# Patient Record
Sex: FEMALE | Race: WHITE | NOT HISPANIC OR LATINO | Employment: UNEMPLOYED | URBAN - NONMETROPOLITAN AREA
[De-identification: names, ages, dates, MRNs, and addresses within clinical notes are randomized per-mention and may not be internally consistent; named-entity substitution may affect disease eponyms.]

---
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
## 2022-01-04 ENCOUNTER — TRANSFERRED RECORDS (OUTPATIENT)
Dept: HEALTH INFORMATION MANAGEMENT | Facility: CLINIC | Age: 22
End: 2022-01-04

## 2022-01-04 ENCOUNTER — MEDICAL CORRESPONDENCE (OUTPATIENT)
Dept: MRI IMAGING | Facility: HOSPITAL | Age: 22
End: 2022-01-04
Payer: COMMERCIAL

## 2022-01-06 ENCOUNTER — HOSPITAL ENCOUNTER (OUTPATIENT)
Dept: MRI IMAGING | Facility: HOSPITAL | Age: 22
Discharge: HOME | End: 2022-01-06
Attending: ORTHOPAEDIC SURGERY | Admitting: ORTHOPAEDIC SURGERY
Payer: COMMERCIAL

## 2022-01-06 DIAGNOSIS — M25.60 DECREASED RANGE OF MOTION: ICD-10-CM

## 2022-01-06 DIAGNOSIS — R52 PAIN: ICD-10-CM

## 2022-01-06 DIAGNOSIS — T14.90XA INJURY: ICD-10-CM

## 2022-01-06 PROCEDURE — 73721 MRI JNT OF LWR EXTRE W/O DYE: CPT | Mod: LT

## 2022-01-21 DIAGNOSIS — F90.2 ATTENTION DEFICIT HYPERACTIVITY DISORDER (ADHD), COMBINED TYPE: ICD-10-CM

## 2022-01-21 RX ORDER — LISDEXAMFETAMINE DIMESYLATE 60 MG/1
60 CAPSULE ORAL DAILY
Qty: 30 CAPSULE | Refills: 0 | Status: SHIPPED | OUTPATIENT
Start: 2022-01-21 | End: 2022-02-21

## 2022-01-21 NOTE — TELEPHONE ENCOUNTER
lisdexamfetamine (VYVANSE) 60 MG capsule        Last Written Prescription Date:  12/23/21  Last Fill Quantity: 30,   # refills: 0  Last Office Visit: 12/23/21  Future Office visit:       Routing refill request to provider for review/approval because:    Drug not on the [facility], [facility] or [facility] refill protocol or controlled substance

## 2022-02-21 ENCOUNTER — VIRTUAL VISIT (OUTPATIENT)
Dept: PEDIATRICS | Facility: OTHER | Age: 22
End: 2022-02-21
Attending: NURSE PRACTITIONER
Payer: COMMERCIAL

## 2022-02-21 DIAGNOSIS — F90.2 ATTENTION DEFICIT HYPERACTIVITY DISORDER (ADHD), COMBINED TYPE: Primary | ICD-10-CM

## 2022-02-21 PROCEDURE — 99213 OFFICE O/P EST LOW 20 MIN: CPT | Mod: 95 | Performed by: NURSE PRACTITIONER

## 2022-02-21 RX ORDER — LISDEXAMFETAMINE DIMESYLATE 60 MG/1
60 CAPSULE ORAL DAILY
Qty: 30 CAPSULE | Refills: 0 | Status: SHIPPED | OUTPATIENT
Start: 2022-02-21 | End: 2022-03-22

## 2022-02-21 ASSESSMENT — PAIN SCALES - GENERAL: PAINLEVEL: NO PAIN (0)

## 2022-02-21 NOTE — PROGRESS NOTES
.Mo is a 21 year old who is being evaluated via a billable telephone visit.      What phone number would you like to be contacted at? [phone]  How would you like to obtain your AVS? Abel    Assessment & Plan     Attention deficit hyperactivity disorder (ADHD), combined type  Will continue Vyvanse 60 mg daily and follow up in about 3 months. Also due for preventative care visit, will complete at the same time.  - lisdexamfetamine (VYVANSE) 60 MG capsule; Take 1 capsule (60 mg) by mouth daily        Return in about 3 months (around 5/21/2022) for Physical Exam, ADHD recheck, sooner with concerns.    Lavonne Wiggins, RODERICK [facility] [place]    Subjective   Mo is a 21 year old who presents for the following health issues     HPI     Medication Followup of Vyvanse     Taking Medication as prescribed: yes    Side Effects:  Loss of appetite but has had this since starting taking. So nothing new     Medication Helping Symptoms:  Yes    Started at [employer] this spring. School is going well. A little more motivation than when in high school.    Not currently working    Tore her ACL at the end of December, will be having surgery at the end of the month         Review of Systems   Constitutional, HEENT, cardiovascular, pulmonary, gi and gu systems are negative, except as otherwise noted.      Objective    Vitals - Patient Reported  Pain Score: No Pain (0)      Vitals:  No vitals were obtained today due to virtual visit.    Physical Exam   healthy, alert and no distress  PSYCH: Alert and oriented times 3; coherent speech, normal   rate and volume, able to articulate logical thoughts, able   to abstract reason, no tangential thoughts, no hallucinations   or delusions  Her affect is normal  RESP: No cough, no audible wheezing, able to talk in full sentences  Remainder of exam unable to be completed due to telephone visits                Phone call duration: 7 minutes

## 2022-02-21 NOTE — NURSING NOTE
"Chief Complaint   Patient presents with     A.D.H.D       Initial There were no vitals taken for this visit. Estimated body mass index is 23.4 kg/m  as calculated from the following:    Height as of 9/13/21: 1.676 m (5' 6\").    Weight as of 12/23/21: 65.8 kg (145 lb).  Medication Reconciliation: complete  Madina Morfin LPN    "

## 2022-02-25 ENCOUNTER — OFFICE VISIT (OUTPATIENT)
Dept: FAMILY MEDICINE | Facility: OTHER | Age: 22
End: 2022-02-25
Attending: NURSE PRACTITIONER
Payer: COMMERCIAL

## 2022-02-25 DIAGNOSIS — Z01.818 PRE-OP EXAM: Primary | ICD-10-CM

## 2022-02-25 PROCEDURE — U0003 INFECTIOUS AGENT DETECTION BY NUCLEIC ACID (DNA OR RNA); SEVERE ACUTE RESPIRATORY SYNDROME CORONAVIRUS 2 (SARS-COV-2) (CORONAVIRUS DISEASE [COVID-19]), AMPLIFIED PROBE TECHNIQUE, MAKING USE OF HIGH THROUGHPUT TECHNOLOGIES AS DESCRIBED BY CMS-2020-01-R: HCPCS

## 2022-02-25 PROCEDURE — U0005 INFEC AGEN DETEC AMPLI PROBE: HCPCS

## 2022-02-26 LAB — SARS-COV-2 RNA RESP QL NAA+PROBE: NEGATIVE

## 2022-03-06 ENCOUNTER — HEALTH MAINTENANCE LETTER (OUTPATIENT)
Age: 22
End: 2022-03-06

## 2022-03-15 ENCOUNTER — TRANSFERRED RECORDS (OUTPATIENT)
Dept: HEALTH INFORMATION MANAGEMENT | Facility: CLINIC | Age: 22
End: 2022-03-15

## 2022-03-22 ENCOUNTER — TELEPHONE (OUTPATIENT)
Dept: PEDIATRICS | Facility: OTHER | Age: 22
End: 2022-03-22
Payer: COMMERCIAL

## 2022-03-22 DIAGNOSIS — F90.2 ATTENTION DEFICIT HYPERACTIVITY DISORDER (ADHD), COMBINED TYPE: ICD-10-CM

## 2022-03-22 RX ORDER — LISDEXAMFETAMINE DIMESYLATE 60 MG/1
60 CAPSULE ORAL DAILY
Qty: 30 CAPSULE | Refills: 0 | Status: SHIPPED | OUTPATIENT
Start: 2022-03-23 | End: 2022-04-21

## 2022-03-22 NOTE — TELEPHONE ENCOUNTER
CALLED AND SPOKE TO PT PARENT AND DUE TO WEATHER PT WILL BE IN TOWN. PT WAS GIVEN THE SCHEDULING NUMBER AND WILL CALL TO CONFIRM APPT

## 2022-03-22 NOTE — PROGRESS NOTES
Assessment & Plan     Attention deficit hyperactivity disorder (ADHD), combined type  Doing well with current 60 mg Vyvanse dosing. Refill was sent in yesterday. Will follow up in about 4-6 months; also due for preventative visit. Can be done this summer or around Mo' birthday. Can have refills for the next 6 months (through September)        22 minutes spent on the date of the encounter doing chart review, history and exam, documentation and further activities per the note           Return in about 4 months (around 7/23/2022) for Physical Exam, ADHD recheck, sooner with concerns.    RODERICK Pollard [facility] [place]    Sonny Hassan is a 21 year old who presents for the following health issues     HPI     ADHD    Onset: since childhood     Description:   Easily distracted: YES  Short attention span: YES  Trouble following directions: YES   Impulsive behavior: YES   Trouble completing tasks: YES    Accompanying Signs & Symptoms:        Change in sleep pattern: recently yes-some nights unable to stay asleep. States normal for her  Irritability at certain times of the day: no  Socially withdrawn: no  Depression symptoms: no  Anxiety symptoms: no    History:  Caffeine intake: Small  Loss of appetite: nothing more than usual   Healthy diet: YES  Did you have problems in school or with previous employment: YES- school a little but not work  Family history of ADHD: no  Have you had an evaluation for ADHD in the past: YES  Do you use alcohol or drugs: no    Therapies tried: Vyvanse with moderate relief    School is going well; not currently working. Recent open ACL repair which is healing well. Pain managed with ibuprofen. PT twice per week at [facility].        Review of Systems   Constitutional, HEENT, cardiovascular, pulmonary, gi and gu systems are negative, except as otherwise noted.      Objective    /70 (BP Location: Right arm, Patient Position: Chair, Cuff  Size: Adult Regular)   Pulse 110   Temp 97.4  F (36.3  C) (Tympanic)   Resp 16   Wt 68 kg (150 lb)   SpO2 100%   BMI 24.21 kg/m    Body mass index is 24.21 kg/m .  Physical Exam   GENERAL: Active, alert, in no acute distress.  HEAD: Normocephalic.  EYES:  No discharge or erythema. Normal pupils and EOM. PERRL.  EARS: Normal canals. Tympanic membranes are normal; gray and translucent.  NOSE: Normal without discharge.  MOUTH/THROAT: Clear. No oral lesions. No oropharyngeal erythema. No tonsillar hypertrophy.  NECK: Supple, no masses.  LYMPH NODES: No adenopathy  LUNGS: Clear. No rales, rhonchi, wheezing or retractions  HEART: Regular rhythm. Normal S1/S2. No murmurs.  NEURO:  No tics or tremor.  Normal tone and strength. Normal gait and balance  MENTAL HEALTH: Mood and affect are appropriate for age.

## 2022-03-22 NOTE — TELEPHONE ENCOUNTER
Pt of [place]    Pt calling and states [facility] is closed in [place] and should have refill there but cannot get.    She picked up her last RX at [facility] in [place].She is leaving [facility] tomorrow and returning Thursday.      Vyvanse   -placed 3.23.2022 for start date  Last Written Prescription Date:2.21.2022    Last Fill Quantity: 30,   # refills: 0  Last Office Visit: 2.21.2022  Future Office visit:    Next 5 appointments (look out 90 days)    Mar 23, 2022  9:45 AM  (Arrive by 9:30 AM)  SHORT with RODERICK Bailon [facility] [place] ([facility] [place] ) [address] [facility] [address]  [phone]           Routing refill request to provider for review/approval because:  Drug not on the [facility], [facility] or  Health refill protocol or controlled substance    Юлия Gonzalez RN

## 2022-03-22 NOTE — TELEPHONE ENCOUNTER
According to the refill request note, Mo is leaving town today and returning Thursday, but I see she has an appointment with me tomorrow (in-person). She is not due to follow up until May, unless she has concerns about her ADHD.    Please call patient to see if she still needs the appointment tomorrow. If not, she can make a preventative visit/ADHD appointment in May or June.

## 2022-03-23 ENCOUNTER — OFFICE VISIT (OUTPATIENT)
Dept: PEDIATRICS | Facility: OTHER | Age: 22
End: 2022-03-23
Attending: NURSE PRACTITIONER
Payer: COMMERCIAL

## 2022-03-23 VITALS
BODY MASS INDEX: 24.21 KG/M2 | HEART RATE: 110 BPM | SYSTOLIC BLOOD PRESSURE: 120 MMHG | WEIGHT: 150 LBS | DIASTOLIC BLOOD PRESSURE: 70 MMHG | TEMPERATURE: 97.4 F | RESPIRATION RATE: 16 BRPM | OXYGEN SATURATION: 100 %

## 2022-03-23 DIAGNOSIS — F90.2 ATTENTION DEFICIT HYPERACTIVITY DISORDER (ADHD), COMBINED TYPE: Primary | ICD-10-CM

## 2022-03-23 PROCEDURE — 99213 OFFICE O/P EST LOW 20 MIN: CPT | Performed by: NURSE PRACTITIONER

## 2022-03-23 RX ORDER — OXYCODONE HYDROCHLORIDE 5 MG/1
TABLET ORAL
COMMUNITY
Start: 2022-03-03 | End: 2022-10-19

## 2022-03-23 ASSESSMENT — PAIN SCALES - GENERAL: PAINLEVEL: NO PAIN (0)

## 2022-03-23 NOTE — NURSING NOTE
"Chief Complaint   Patient presents with     A.D.H.D       Initial /70 (BP Location: Right arm, Patient Position: Chair, Cuff Size: Adult Regular)   Pulse 110   Temp 97.4  F (36.3  C) (Tympanic)   Resp 16   Wt 68 kg (150 lb)   SpO2 100%   BMI 24.21 kg/m   Estimated body mass index is 24.21 kg/m  as calculated from the following:    Height as of 9/13/21: 1.676 m (5' 6\").    Weight as of this encounter: 68 kg (150 lb).  Medication Reconciliation: complete  Madina Morfin LPN    "

## 2022-03-29 ENCOUNTER — TRANSFERRED RECORDS (OUTPATIENT)
Dept: HEALTH INFORMATION MANAGEMENT | Facility: CLINIC | Age: 22
End: 2022-03-29

## 2022-04-21 ENCOUNTER — MYC REFILL (OUTPATIENT)
Dept: PEDIATRICS | Facility: OTHER | Age: 22
End: 2022-04-21
Payer: COMMERCIAL

## 2022-04-21 DIAGNOSIS — F90.2 ATTENTION DEFICIT HYPERACTIVITY DISORDER (ADHD), COMBINED TYPE: ICD-10-CM

## 2022-04-22 RX ORDER — LISDEXAMFETAMINE DIMESYLATE 60 MG/1
60 CAPSULE ORAL DAILY
Qty: 30 CAPSULE | Refills: 0 | Status: SHIPPED | OUTPATIENT
Start: 2022-04-22 | End: 2022-05-20

## 2022-04-22 NOTE — TELEPHONE ENCOUNTER
Lakeshia      Last Written Prescription Date:  3/23/22  Last Fill Quantity: 30,   # refills: 0  Last Office Visit: 3/23/22  Future Office visit:

## 2022-05-03 ENCOUNTER — TRANSFERRED RECORDS (OUTPATIENT)
Dept: HEALTH INFORMATION MANAGEMENT | Facility: CLINIC | Age: 22
End: 2022-05-03

## 2022-05-20 ENCOUNTER — MYC REFILL (OUTPATIENT)
Dept: PEDIATRICS | Facility: OTHER | Age: 22
End: 2022-05-20
Payer: COMMERCIAL

## 2022-05-20 DIAGNOSIS — F90.2 ATTENTION DEFICIT HYPERACTIVITY DISORDER (ADHD), COMBINED TYPE: ICD-10-CM

## 2022-05-23 RX ORDER — LISDEXAMFETAMINE DIMESYLATE 60 MG/1
60 CAPSULE ORAL DAILY
Qty: 30 CAPSULE | Refills: 0 | Status: SHIPPED | OUTPATIENT
Start: 2022-05-23 | End: 2022-06-17

## 2022-05-23 NOTE — TELEPHONE ENCOUNTER
lisdexamfetamine (VYVANSE) 60 MG capsule      Last Written Prescription Date:  04/22/22  Last Fill Quantity: 30,   # refills: 0  Last Office Visit: 03/23/22  Future Office visit:       Routing refill request to provider for review/approval because:  Drug not on the [facility], [facility] or [facility] refill protocol or controlled substance

## 2022-08-16 ENCOUNTER — MYC REFILL (OUTPATIENT)
Dept: PEDIATRICS | Facility: OTHER | Age: 22
End: 2022-08-16

## 2022-08-16 DIAGNOSIS — F90.2 ATTENTION DEFICIT HYPERACTIVITY DISORDER (ADHD), COMBINED TYPE: ICD-10-CM

## 2022-08-18 RX ORDER — LISDEXAMFETAMINE DIMESYLATE 60 MG/1
60 CAPSULE ORAL DAILY
Qty: 30 CAPSULE | Refills: 0 | Status: SHIPPED | OUTPATIENT
Start: 2022-08-18 | End: 2022-09-15

## 2022-08-18 NOTE — TELEPHONE ENCOUNTER
Lakeshia      Last Written Prescription Date:  7/20/22  Last Fill Quantity: 30,   # refills: 0  Last Office Visit: 3/23/22  Future Office visit:

## 2022-09-15 ENCOUNTER — MYC REFILL (OUTPATIENT)
Dept: PEDIATRICS | Facility: OTHER | Age: 22
End: 2022-09-15

## 2022-09-15 DIAGNOSIS — F90.2 ATTENTION DEFICIT HYPERACTIVITY DISORDER (ADHD), COMBINED TYPE: ICD-10-CM

## 2022-09-16 RX ORDER — LISDEXAMFETAMINE DIMESYLATE 60 MG/1
60 CAPSULE ORAL DAILY
Qty: 30 CAPSULE | Refills: 0 | Status: SHIPPED | OUTPATIENT
Start: 2022-09-16 | End: 2022-10-19

## 2022-09-16 NOTE — TELEPHONE ENCOUNTER
vyvance      Last Written Prescription Date:  8/18/22  Last Fill Quantity: 30,   # refills: 0  Last Office Visit: 3/23/22  Future Office visit:

## 2022-10-12 ENCOUNTER — TELEPHONE (OUTPATIENT)
Dept: PEDIATRICS | Facility: OTHER | Age: 22
End: 2022-10-12

## 2022-10-12 NOTE — TELEPHONE ENCOUNTER
4:16 PM    Reason for Call: Phone Call    Description: pt wants to talk to the nurse to see if she is due for another med ck    Was an appointment offered for this call? No  If yes : Appointment type              Date    Preferred method for responding to this message: Telephone Call  What is your phone number ?[phone]    If we cannot reach you directly, may we leave a detailed response at the number you provided? Yes    Can this message wait until your PCP/provider returns, if available today? Elisabeth Michael

## 2022-10-19 ENCOUNTER — VIRTUAL VISIT (OUTPATIENT)
Dept: PEDIATRICS | Facility: OTHER | Age: 22
End: 2022-10-19
Attending: NURSE PRACTITIONER
Payer: COMMERCIAL

## 2022-10-19 DIAGNOSIS — F90.2 ATTENTION DEFICIT HYPERACTIVITY DISORDER (ADHD), COMBINED TYPE: Primary | ICD-10-CM

## 2022-10-19 PROCEDURE — 99213 OFFICE O/P EST LOW 20 MIN: CPT | Mod: 95 | Performed by: NURSE PRACTITIONER

## 2022-10-19 RX ORDER — LISDEXAMFETAMINE DIMESYLATE 60 MG/1
60 CAPSULE ORAL DAILY
Qty: 30 CAPSULE | Refills: 0 | Status: SHIPPED | OUTPATIENT
Start: 2022-10-19 | End: 2022-11-14

## 2022-10-19 RX ORDER — OMEGA-3 FATTY ACIDS/FISH OIL 300-1000MG
400 CAPSULE ORAL EVERY 4 HOURS PRN
COMMUNITY

## 2022-10-19 NOTE — NURSING NOTE
"Chief Complaint   Patient presents with     Recheck Medication       Initial There were no vitals taken for this visit. Estimated body mass index is 24.21 kg/m  as calculated from the following:    Height as of 9/13/21: 1.676 m (5' 6\").    Weight as of 3/23/22: 68 kg (150 lb).  Medication Reconciliation: complete  Felisha Tate LPN    "

## 2022-10-19 NOTE — PROGRESS NOTES
Mo is a 22 year old who is being evaluated via a billable telephone visit.      What phone number would you like to be contacted at? [phone]  How would you like to obtain your AVS? MyChart    Assessment & Plan     Attention deficit hyperactivity disorder (ADHD), combined type  Will continue Vyvanse 60 mg daily. Refill sent.   - lisdexamfetamine (VYVANSE) 60 MG capsule; Take 1 capsule (60 mg) by mouth daily      Return in 6 weeks (on 11/28/2022) for Physical Exam.    RODERICK Pollard [facility] - [place]    Subjective   Mo is a 22 year old, presenting for the following health issues:  Recheck Medication      HPI     Medication Followup of Vyvanse 60 mg    Taking Medication as prescribed: yes    Side Effects:  Lack of appetite, but states she is eating although she has a lack of appetite    Medication Helping Symptoms:  Yes    Is currently in school at [employer] two days per week. 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 Not currently working (had previously been working at the [employer], and they closed for the winter season).     Sleeping as usual. Some days are more difficult to sleep, others are easier.        Review of Systems   Constitutional, HEENT, cardiovascular, pulmonary, gi and gu systems are negative, except as otherwise noted.      Objective    Vitals - Patient Reported  Weight (Patient Reported):  (patient unsure)  Temperature (Patient Reported):  (afebrile)  Pain Score: No Pain (0)        Physical Exam   healthy, alert and no distress  PSYCH: Alert and oriented times 3; coherent speech, normal   rate and volume, able to articulate logical thoughts, able   to abstract reason, no tangential thoughts, no hallucinations   or delusions  Her affect is normal  RESP: No cough, no audible wheezing, able to talk in full sentences  Remainder of exam unable to be completed due to telephone visits                Phone call duration: 6 minutes

## 2022-11-14 ENCOUNTER — MYC REFILL (OUTPATIENT)
Dept: PEDIATRICS | Facility: OTHER | Age: 22
End: 2022-11-14

## 2022-11-14 DIAGNOSIS — F90.2 ATTENTION DEFICIT HYPERACTIVITY DISORDER (ADHD), COMBINED TYPE: ICD-10-CM

## 2022-11-14 RX ORDER — LISDEXAMFETAMINE DIMESYLATE 60 MG/1
60 CAPSULE ORAL DAILY
Qty: 30 CAPSULE | Refills: 0 | Status: SHIPPED | OUTPATIENT
Start: 2022-11-14 | End: 2022-12-15

## 2022-11-14 NOTE — TELEPHONE ENCOUNTER
loki      Last Written Prescription Date:  10/19/2022  Last Fill Quantity: 30,   # refills: 0  Last Office Visit: 10/19/2022  Future Office visit:    Next 5 appointments (look out 90 days)    Nov 28, 2022  9:45 AM  (Arrive by 9:30 AM)  PHYSICAL with RODERICK Bailon CNP  [facility] [place] ([facility] [place] ) [address]  [place] [address]  [phone]           Routing refill request to provider for review/approval because:

## 2022-11-21 ENCOUNTER — HEALTH MAINTENANCE LETTER (OUTPATIENT)
Age: 22
End: 2022-11-21

## 2022-12-15 ENCOUNTER — MYC REFILL (OUTPATIENT)
Dept: PEDIATRICS | Facility: OTHER | Age: 22
End: 2022-12-15

## 2022-12-15 DIAGNOSIS — F90.2 ATTENTION DEFICIT HYPERACTIVITY DISORDER (ADHD), COMBINED TYPE: ICD-10-CM

## 2022-12-15 RX ORDER — LISDEXAMFETAMINE DIMESYLATE 60 MG/1
60 CAPSULE ORAL DAILY
Qty: 30 CAPSULE | Refills: 0 | Status: SHIPPED | OUTPATIENT
Start: 2022-12-15 | End: 2023-01-14

## 2023-01-14 ENCOUNTER — MYC REFILL (OUTPATIENT)
Dept: PEDIATRICS | Facility: OTHER | Age: 23
End: 2023-01-14

## 2023-01-14 DIAGNOSIS — F90.2 ATTENTION DEFICIT HYPERACTIVITY DISORDER (ADHD), COMBINED TYPE: ICD-10-CM

## 2023-01-16 RX ORDER — LISDEXAMFETAMINE DIMESYLATE 60 MG/1
60 CAPSULE ORAL DAILY
Qty: 30 CAPSULE | Refills: 0 | Status: SHIPPED | OUTPATIENT
Start: 2023-01-16 | End: 2023-02-10

## 2023-02-10 ENCOUNTER — MYC REFILL (OUTPATIENT)
Dept: PEDIATRICS | Facility: OTHER | Age: 23
End: 2023-02-10

## 2023-02-10 DIAGNOSIS — F90.2 ATTENTION DEFICIT HYPERACTIVITY DISORDER (ADHD), COMBINED TYPE: ICD-10-CM

## 2023-02-13 RX ORDER — LISDEXAMFETAMINE DIMESYLATE 60 MG/1
60 CAPSULE ORAL DAILY
Qty: 30 CAPSULE | Refills: 0 | Status: SHIPPED | OUTPATIENT
Start: 2023-02-13 | End: 2023-03-14

## 2023-02-13 NOTE — TELEPHONE ENCOUNTER
lisdexamfetamine 60mg      Last Written Prescription Date:  1/16/23  Last Fill Quantity: 30,   # refills: 0  Last Office Visit: 11/28/22  Future Office visit:       Routing refill request to provider for review/approval because:

## 2023-03-14 ENCOUNTER — MYC REFILL (OUTPATIENT)
Dept: PEDIATRICS | Facility: OTHER | Age: 23
End: 2023-03-14

## 2023-03-14 DIAGNOSIS — F90.2 ATTENTION DEFICIT HYPERACTIVITY DISORDER (ADHD), COMBINED TYPE: ICD-10-CM

## 2023-03-16 RX ORDER — LISDEXAMFETAMINE DIMESYLATE 60 MG/1
60 CAPSULE ORAL DAILY
Qty: 30 CAPSULE | Refills: 0 | Status: SHIPPED | OUTPATIENT
Start: 2023-03-16 | End: 2023-04-14

## 2023-03-16 NOTE — TELEPHONE ENCOUNTER
Mo is overdue to be seen in clinic for a preventative visit and ADHD follow up. Please call patient to schedule.

## 2023-03-16 NOTE — TELEPHONE ENCOUNTER
Attempt # 1  Outcome: Talked with Patient    Comment: spoke to pt and advised due for physical/adhd follow up; pt states will call back to schedule

## 2023-03-16 NOTE — TELEPHONE ENCOUNTER
Lisdexamfetamine (Vyvanse) 60 MG capsule    Last Written Prescription Date:  02/13/2023  Last Fill Quantity: 30,   # refills: 0  Last Office Visit: 03/23/2022

## 2023-03-28 RX ORDER — TRETINOIN 0.25 MG/G
CREAM TOPICAL
COMMUNITY
Start: 2022-11-16 | End: 2023-10-02

## 2023-03-28 RX ORDER — AZELAIC ACID 0.15 G/G
GEL TOPICAL
COMMUNITY
Start: 2022-11-16 | End: 2023-10-02

## 2023-03-29 ENCOUNTER — OFFICE VISIT (OUTPATIENT)
Dept: PEDIATRICS | Facility: OTHER | Age: 23
End: 2023-03-29
Attending: NURSE PRACTITIONER
Payer: COMMERCIAL

## 2023-03-29 ENCOUNTER — TELEPHONE (OUTPATIENT)
Dept: PEDIATRICS | Facility: OTHER | Age: 23
End: 2023-03-29

## 2023-03-29 VITALS
TEMPERATURE: 97.4 F | DIASTOLIC BLOOD PRESSURE: 66 MMHG | WEIGHT: 142 LBS | BODY MASS INDEX: 22.82 KG/M2 | HEART RATE: 104 BPM | OXYGEN SATURATION: 99 % | SYSTOLIC BLOOD PRESSURE: 112 MMHG | HEIGHT: 66 IN

## 2023-03-29 DIAGNOSIS — F90.2 ATTENTION DEFICIT HYPERACTIVITY DISORDER (ADHD), COMBINED TYPE: Primary | ICD-10-CM

## 2023-03-29 DIAGNOSIS — Z02.5 SPORTS PHYSICAL: ICD-10-CM

## 2023-03-29 PROCEDURE — 99213 OFFICE O/P EST LOW 20 MIN: CPT | Performed by: NURSE PRACTITIONER

## 2023-03-29 ASSESSMENT — PAIN SCALES - GENERAL: PAINLEVEL: NO PAIN (0)

## 2023-03-29 NOTE — PROGRESS NOTES
Assessment & Plan   1. Attention deficit hyperactivity disorder (ADHD), combined type  Stable. Will continue on Vyvanse 60 mg daily.    2. Sports physical  Cleared for softball, form completed and left at  for Mo to .         Return in about 6 months (around 9/29/2023) for Physical Exam, Medication follow up.  Marta Penny, Nurse Practitioner Student    I have seen this patient with the student. The student documented the visit and I have edited and verified the history, physical examination, assessment and plan. The examination and medical decision making was performed by me.    RODERICK Pollard [facility] [place]    Subjective   Mo is a 22 year old, presenting for the following health issues:  A.D.H.D  No flowsheet data found.  HPI     ADHD    Onset: since childhood     Description:   Easily distracted: YES  Short attention span: YES  Trouble following directions: YES   Impulsive behavior: YES   Trouble completing tasks: YES    Accompanying Signs & Symptoms:        Change in sleep pattern: none  Irritability at certain times of the day: YES  Socially withdrawn: No  Depression symptoms: No  Anxiety symptoms: YES    History:  Caffeine intake: Small  Loss of appetite: YES  Healthy diet: No  Did you have problems in school or with previous employment: YES  Family history of ADHD: No  Have you had an evaluation for ADHD in the past: YES  Do you use alcohol or drugs: No    Therapies tried: vyvanse with intermittent relief    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  Unchanged decreased appetite, but has been eating small amounts several times a day rather than full meals x3 per day.  Endorses getting enough fluids. Denies N/V.   Sleep is good-approximately 7-8 hour per day. Chronic waking x1 per night, is not concerned about this, she gets up to have a snack or go to the  "bathroom.    Would like sports physical done today, too. Plans to play softball at [employer]. Has form in her car and will bring in today.    Review of Systems   Constitutional, HEENT, cardiovascular, pulmonary, gi and gu systems are negative, except as otherwise noted.      Objective    /66 (BP Location: Right arm, Patient Position: Sitting, Cuff Size: Adult Regular)   Pulse 104   Temp 97.4  F (36.3  C) (Tympanic)   Ht 1.676 m (5' 6\")   Wt 64.4 kg (142 lb)   LMP 03/22/2023 (Approximate)   SpO2 99%   BMI 22.92 kg/m    Body mass index is 22.92 kg/m .  Physical Exam   GENERAL: healthy, alert and no distress  EYES: Eyes grossly normal to inspection, PERRL and conjunctivae and sclerae normal  HENT: ear canals and TM's normal, nose and mouth without ulcers or lesions  NECK: no adenopathy, no asymmetry, masses, or scars and thyroid normal to palpation  RESP: lungs clear to auscultation - no rales, rhonchi or wheezes  BREAST: normal without masses, tenderness or nipple discharge and no palpable axillary masses or adenopathy  CV: regular rate and rhythm, normal S1 S2, no S3 or S4, no murmur, click or rub, no peripheral edema and peripheral pulses strong  ABDOMEN: soft, nontender, no hepatosplenomegaly, no masses and bowel sounds normal  MS: no gross musculoskeletal defects noted, no edema  SKIN: no suspicious lesions or rashes  NEURO: Normal strength and tone, mentation intact and speech normal  PSYCH: mentation appears normal, affect normal/bright  SPORTS EXAM:    No Marfan stigmata: kyphoscoliosis, high-arched palate, pectus excavatuM, arachnodactyly, arm span > height, hyperlaxity, myopia, MVP, aortic insufficieny)  Eyes: normal fundoscopic and pupils  Cardiovascular: normal PMI, simultaneous femoral/radial pulses, no murmurs (standing, supine, Valsalva)  Skin: no HSV, MRSA, tinea corporis  Musculoskeletal    Neck: normal    Back: normal    Shoulder/arm: normal    Elbow/forearm: normal    Wrist/hand/fingers: " normal    Hip/thigh: normal    Knee: normal    Leg/ankle: normal    Foot/toes: normal    Functional (Single Leg Hop or Squat): normal      Diagnostics: None today

## 2023-04-14 ENCOUNTER — MYC REFILL (OUTPATIENT)
Dept: PEDIATRICS | Facility: OTHER | Age: 23
End: 2023-04-14

## 2023-04-14 DIAGNOSIS — F90.2 ATTENTION DEFICIT HYPERACTIVITY DISORDER (ADHD), COMBINED TYPE: ICD-10-CM

## 2023-04-16 ENCOUNTER — HEALTH MAINTENANCE LETTER (OUTPATIENT)
Age: 23
End: 2023-04-16

## 2023-04-17 RX ORDER — LISDEXAMFETAMINE DIMESYLATE 60 MG/1
60 CAPSULE ORAL DAILY
Qty: 30 CAPSULE | Refills: 0 | Status: SHIPPED | OUTPATIENT
Start: 2023-04-17 | End: 2023-05-11

## 2023-04-17 NOTE — TELEPHONE ENCOUNTER
vyvance      Last Written Prescription Date:  3/16/23  Last Fill Quantity: 30,   # refills: 0  Last Office Visit: 3/29/23  Future Office visit:

## 2023-05-11 ENCOUNTER — MYC REFILL (OUTPATIENT)
Dept: PEDIATRICS | Facility: OTHER | Age: 23
End: 2023-05-11

## 2023-05-11 DIAGNOSIS — F90.2 ATTENTION DEFICIT HYPERACTIVITY DISORDER (ADHD), COMBINED TYPE: ICD-10-CM

## 2023-05-12 RX ORDER — LISDEXAMFETAMINE DIMESYLATE 60 MG/1
60 CAPSULE ORAL DAILY
Qty: 30 CAPSULE | Refills: 0 | Status: SHIPPED | OUTPATIENT
Start: 2023-05-12 | End: 2023-06-10

## 2023-05-12 NOTE — TELEPHONE ENCOUNTER
Lisdexamfetamine (Vyvanse) 60 MG capsule    Last Written Prescription Date:  04/17/2023  Last Fill Quantity: 30,   # refills: 0  Last Office Visit: 03/29/2023  Future Office visit:       Routing refill request to provider for review/approval because:  Drug not on the [facility], P or [facility] refill protocol or controlled substance

## 2023-06-10 ENCOUNTER — MYC REFILL (OUTPATIENT)
Dept: PEDIATRICS | Facility: OTHER | Age: 23
End: 2023-06-10

## 2023-06-10 DIAGNOSIS — F90.2 ATTENTION DEFICIT HYPERACTIVITY DISORDER (ADHD), COMBINED TYPE: ICD-10-CM

## 2023-06-12 RX ORDER — LISDEXAMFETAMINE DIMESYLATE 60 MG/1
60 CAPSULE ORAL DAILY
Qty: 30 CAPSULE | Refills: 0 | Status: SHIPPED | OUTPATIENT
Start: 2023-06-12 | End: 2023-07-10

## 2023-06-12 NOTE — TELEPHONE ENCOUNTER
loki       Last Written Prescription Date:  5-12-23  Last Fill Quantity: 30,   # refills: 0  Last Office Visit: 3-29-23  Future Office visit:       Routing refill request to provider for review/approval because:

## 2023-07-10 ENCOUNTER — MYC REFILL (OUTPATIENT)
Dept: PEDIATRICS | Facility: OTHER | Age: 23
End: 2023-07-10

## 2023-07-10 DIAGNOSIS — F90.2 ATTENTION DEFICIT HYPERACTIVITY DISORDER (ADHD), COMBINED TYPE: ICD-10-CM

## 2023-07-10 RX ORDER — LISDEXAMFETAMINE DIMESYLATE 60 MG/1
60 CAPSULE ORAL DAILY
Qty: 30 CAPSULE | Refills: 0 | Status: SHIPPED | OUTPATIENT
Start: 2023-07-10 | End: 2023-08-09

## 2023-07-10 NOTE — TELEPHONE ENCOUNTER
Lisdexamfetamine (Vyvanse) 60 MG capsule    Last Written Prescription Date:  06/12/2023  Last Fill Quantity: 30,   # refills: 0  Last Office Visit: 03/29/2023  Future Office visit:       Routing refill request to provider for review/approval because:  Drug not on the [facility], P or [facility] refill protocol or controlled substance

## 2023-07-20 NOTE — TELEPHONE ENCOUNTER
Mom called patient has appointment on 9/27 and only has 4 pills left.  Please advise.    lisdexamfetamine (VYVANSE) 60 MG capsule       Last Written Prescription Date:  8/27/20  Last Fill Quantity: 15,   # refills: 0  Last Office Visit: 12/6/19  Future Office visit:    Next 5 appointments (look out 90 days)    Sep 17, 2020  1:30 PM CDT  (Arrive by 1:15 PM)  SHORT with Dell Erickson DO  [facility] ([facility] [place] ) [address]  [place] [address]  [phone]           Routing refill request to provider for review/approval because:  Drug not on the [facility], P or [facility] refill protocol or controlled substance    
Show Aperture Variable?: Yes
Duration Of Freeze Thaw-Cycle (Seconds): 10
Detail Level: Simple
Post-Care Instructions: I reviewed with the patient in detail post-care instructions. Patient is to wear sunprotection, and avoid picking at any of the treated lesions. Pt may apply Vaseline to crusted or scabbing areas.
Render Post-Care Instructions In Note?: no
Number Of Freeze-Thaw Cycles: 3 freeze-thaw cycles
Consent: The patient's consent was obtained including but not limited to risks of crusting, scabbing, blistering, scarring, darker or lighter pigmentary change, recurrence, incomplete removal and infection.

## 2023-07-27 ENCOUNTER — APPOINTMENT (OUTPATIENT)
Dept: GENERAL RADIOLOGY | Facility: HOSPITAL | Age: 23
End: 2023-07-27
Attending: NURSE PRACTITIONER
Payer: COMMERCIAL

## 2023-07-27 ENCOUNTER — HOSPITAL ENCOUNTER (EMERGENCY)
Facility: HOSPITAL | Age: 23
Discharge: HOME | End: 2023-07-27
Attending: NURSE PRACTITIONER | Admitting: NURSE PRACTITIONER
Payer: COMMERCIAL

## 2023-07-27 VITALS
BODY MASS INDEX: 23.27 KG/M2 | WEIGHT: 144.18 LBS | TEMPERATURE: 97.7 F | DIASTOLIC BLOOD PRESSURE: 78 MMHG | OXYGEN SATURATION: 100 % | SYSTOLIC BLOOD PRESSURE: 108 MMHG | HEART RATE: 71 BPM | RESPIRATION RATE: 16 BRPM

## 2023-07-27 DIAGNOSIS — S61.319A LACERATION OF FINGERNAIL, INITIAL ENCOUNTER: ICD-10-CM

## 2023-07-27 PROCEDURE — 73140 X-RAY EXAM OF FINGER(S): CPT | Mod: LT

## 2023-07-27 PROCEDURE — 99213 OFFICE O/P EST LOW 20 MIN: CPT | Performed by: NURSE PRACTITIONER

## 2023-07-27 PROCEDURE — G0463 HOSPITAL OUTPT CLINIC VISIT: HCPCS

## 2023-07-27 ASSESSMENT — ENCOUNTER SYMPTOMS
VOMITING: 0
NAUSEA: 0
WOUND: 1
LIGHT-HEADEDNESS: 1
FEVER: 0
DIAPHORESIS: 1
NUMBNESS: 0
CHILLS: 0
ACTIVITY CHANGE: 1

## 2023-07-27 ASSESSMENT — ACTIVITIES OF DAILY LIVING (ADL): ADLS_ACUITY_SCORE: 35

## 2023-07-27 NOTE — ED PROVIDER NOTES
History     Chief Complaint   Patient presents with    Hand Injury     HPI  Mo Tellez is a 22 year old female who is accompanied per mom.  She presents with a left middle finger injury after slamming her finger in a car door.  Fingernail is split and finger is tingling.  Had diaphoresis and lightheadedness after injury occurred; has resolved.  Took ibuprofen after incident occurred and has wrapped her finger with dry dressing.  Denies previous injuries.  Last Tdap 2014.  Denies fevers, chills, nausea, and vomiting.    Musculoskeletal problem/pain    Duration:  today  Description  Location: left middle finger. Right hand dominant  Intensity:  2/10. throbbing  Accompanying signs and symptoms: tingling  History  Previous similar problem: no   Previous evaluation:  none  Precipitating or alleviating factors:  Trauma or overuse: YES- slammed in car door  Aggravating factors include: moving  Therapies tried and outcome: support wrap and Ibuprofen       Allergies:  No Known Allergies    Problem List:    Patient Active Problem List    Diagnosis Date Noted    Anxiety disorder, unspecified 11/29/2019     Priority: Medium    High ankle sprain 10/04/2017     Priority: Medium    Attention deficit hyperactivity disorder (ADHD), combined type 09/28/2016     Priority: Medium    Common wart 09/19/2014     Priority: Medium    Plantar warts 09/19/2014     Priority: Medium        Past Medical History:    Past Medical History:   Diagnosis Date    Anisocoria 07/20/2018    Attention deficit disorder of childhood with hyper 03/08/2013       Past Surgical History:    Past Surgical History:   Procedure Laterality Date    KNEE SURGERY Left 03/02/2022    Open ACL repair    PE TUBES      TUBES         Family History:    Family History   Problem Relation Age of Onset    Arthritis Mother     Depression Mother     Diabetes Paternal Grandmother     Cancer Maternal Grandmother         lymphoma    Depression Maternal Grandmother     Cancer  Paternal Grandfather         lymphoma    Asthma No family hx of        Social History:  Marital Status:  Single [1]  Social History     Tobacco Use    Smoking status: Never    Smokeless tobacco: Never    Tobacco comments:     no passive exposure   Vaping Use    Vaping Use: Never used   Substance Use Topics    Alcohol use: No     Alcohol/week: 0.0 standard drinks of alcohol    Drug use: No        Medications:    azelaic acid (FINACIA) 15 % external gel  lisdexamfetamine (VYVANSE) 60 MG capsule  tretinoin (RETIN-A) 0.025 % external cream  tretinoin (RETIN-A) 0.05 % external gel  albuterol (PROAIR HFA/PROVENTIL HFA/VENTOLIN HFA) 108 (90 Base) MCG/ACT inhaler  clindamycin (CLEOCIN T) 1 % external solution  ibuprofen (ADVIL/MOTRIN) 200 MG capsule          Review of Systems   Constitutional:  Positive for activity change and diaphoresis. Negative for chills and fever.   Gastrointestinal:  Negative for nausea and vomiting.   Musculoskeletal:         Left middle finger injury   Skin:  Positive for wound (left middle finger).   Neurological:  Positive for light-headedness. Negative for numbness (tingling left middle finger).       Physical Exam   BP: 108/78  Pulse: 71  Temp: 97.7  F (36.5  C)  Resp: 16  Weight: 65.4 kg (144 lb 2.9 oz)  SpO2: 100 %      Physical Exam  Vitals and nursing note reviewed.   Constitutional:       General: She is in acute distress (Mild).      Appearance: She is normal weight.   Cardiovascular:      Rate and Rhythm: Normal rate.   Pulmonary:      Effort: Pulmonary effort is normal.   Musculoskeletal:         General: Tenderness and signs of injury present. No swelling.      Left hand: Laceration (Fingernail of middle finger) and tenderness present. Decreased range of motion. Normal pulse.        Hands:    Skin:     Findings: No bruising or erythema.   Neurological:      Mental Status: She is alert and oriented to person, place, and time.   Psychiatric:         Behavior: Behavior normal.         ED  Course                 Procedures           Results for orders placed or performed during the hospital encounter of 07/27/23 (from the past 24 hour(s))   Fingers XR, 2-3 views, left    Narrative    PROCEDURE:  XR FINGER LEFT G/E 2 VIEWS    HISTORY: slammed in car door. finger tip injury    COMPARISON:  None.    TECHNIQUE:  XR FINGER LEFT 3 VIEWS    FINDINGS:   No fracture or dislocation is identified. The joint spaces are  preserved.     No foreign body is seen.     Soft tissues are within normal limits.        Impression    IMPRESSION:   No acute osseous abnormality.    ELDER ARSHAD MD         SYSTEM ID:  [id number]       Medications - No data to display    Assessments & Plan (with Medical Decision Making)     I have reviewed the nursing notes.    I have reviewed the findings, diagnosis, plan and need for follow up with the patient.  (C11.899P) Laceration of fingernail, initial encounter  Comment:22 year old female who is accompanied per mom.  She presents with a left middle finger injury after slamming her finger in a car door.  Fingernail is split and finger is tingling.  Had diaphoresis and lightheadedness after injury occurred; has resolved.  Took ibuprofen after incident occurred and has wrapped her finger with dry dressing.  Denies previous injuries.  Last Tdap 2014.  Denies fevers, chills, nausea, and vomiting.    MDM: Laceration of distal, lateral, half of  left middle finger fingernail.  No swelling, ecchymosis, or erythema. Slightly decreased flexion and extension related to pain.  CMS intact    Left middle finger x-ray reviewed and per radiology:No acute osseous abnormality.     Secure seal applied to left middle fingernail.   Dressing applied to finger per LPN.    Plan:Do once or  twice daily dressing changes for the next 48 to 72 hours. You may shower . If you have increased pain, redness at wound site, fevers, or abnormal drainage (purulent/pus) you need to see your primary care provider or return to  Urgent Care/ER immediately. Acetaminophen/tylenol  or ibuprofen for pain.  Verbally instructed: Glue will come off by itself. Try no to pick at it. Keep fingernail trimmed  These discharge instructions and medications were reviewed with her and mom and understanding verbalized.    This document was prepared using a combination of typing and voice generated software.  While every attempt was made for accuracy, spelling and grammatical errors may exist.  Discharge Medication List as of 7/27/2023  5:07 PM          Final diagnoses:   Laceration of fingernail, initial encounter       7/27/2023   [facility]         Rachna Pugh, CNP  07/27/23 [id number]

## 2023-07-27 NOTE — DISCHARGE INSTRUCTIONS
Do once or  twice daily dressing changes for the next 48 to 72 hours. You may shower . If you have increased pain, redness at wound site, fevers, or abnormal drainage (purulent/pus) you need to see your primary care provider or return to Urgent Care/ER immediately. Acetaminophen/tylenol  or ibuprofen for pain. Glue will come off by itself. Try no to pick at it. Keep fingernail trimmed

## 2023-07-27 NOTE — ED TRIAGE NOTES
Pt presents with c/o slamming middle finger in a car door.   Pt does have a cut down the nail and is bleeding currently   No bruising or swelling noted   Pt has full ROM with throbbing pain   Denies any previous hx of fracture or injury to that finger   Denies being on blood thinners   Happened today   Last had ibu at 1530

## 2023-08-09 ENCOUNTER — MYC REFILL (OUTPATIENT)
Dept: PEDIATRICS | Facility: OTHER | Age: 23
End: 2023-08-09

## 2023-08-09 DIAGNOSIS — F90.2 ATTENTION DEFICIT HYPERACTIVITY DISORDER (ADHD), COMBINED TYPE: ICD-10-CM

## 2023-08-09 RX ORDER — LISDEXAMFETAMINE DIMESYLATE 60 MG/1
60 CAPSULE ORAL DAILY
Qty: 30 CAPSULE | Refills: 0 | Status: SHIPPED | OUTPATIENT
Start: 2023-08-09 | End: 2023-08-14

## 2023-08-09 NOTE — TELEPHONE ENCOUNTER
Lakeshia       Last Written Prescription Date:  7-10-23  Last Fill Quantity: 30,   # refills: 0  Last Office Visit: 3-29-23  Future Office visit:       Routing refill request to provider for review/approval because:

## 2023-09-07 ENCOUNTER — MYC MEDICAL ADVICE (OUTPATIENT)
Dept: PEDIATRICS | Facility: OTHER | Age: 23
End: 2023-09-07

## 2023-09-11 ENCOUNTER — MYC MEDICAL ADVICE (OUTPATIENT)
Dept: PEDIATRICS | Facility: OTHER | Age: 23
End: 2023-09-11

## 2023-09-11 DIAGNOSIS — F90.2 ATTENTION DEFICIT HYPERACTIVITY DISORDER (ADHD), COMBINED TYPE: ICD-10-CM

## 2023-09-11 RX ORDER — LISDEXAMFETAMINE DIMESYLATE 60 MG/1
60 CAPSULE ORAL DAILY
Qty: 30 CAPSULE | Refills: 0 | Status: SHIPPED | OUTPATIENT
Start: 2023-09-11 | End: 2023-09-12

## 2023-09-11 NOTE — TELEPHONE ENCOUNTER
Lakeshia       Last Written Prescription Date:  8/14/23  Last Fill Quantity: 30,   # refills: 0  Last Office Visit: 3-29-23  Future Office visit:    Next 5 appointments (look out 90 days)      Sep 25, 2023  2:00 PM  (Arrive by 1:45 PM)  PHYSICAL with RODERICK Bailon CNP  [facility] [place] ([facility] [place] ) [address]  [place] [address]  [phone]             Routing refill request to provider for review/approval because:  Drug not on the [facility], [facility] or [facility] refill protocol or controlled substance

## 2023-09-12 ENCOUNTER — MYC MEDICAL ADVICE (OUTPATIENT)
Dept: PEDIATRICS | Facility: OTHER | Age: 23
End: 2023-09-12

## 2023-09-12 DIAGNOSIS — F90.2 ATTENTION DEFICIT HYPERACTIVITY DISORDER (ADHD), COMBINED TYPE: ICD-10-CM

## 2023-09-12 NOTE — TELEPHONE ENCOUNTER
Marcos Alfaro  1954  [id number]    Situation:  Verbal report received from: Jami Bolivar  Procedure: Procedure(s):  COLONOSCOPY  ENDOSCOPIC POLYPECTOMY    Background:    Preoperative diagnosis: HEMOCULT BLOOD IN STOOL  Postoperative diagnosis: colon polyps, hemorrhoids    :  Dr. Ang Malone  Assistant(s): Endoscopy RN-1: Adela Urbano RN; Kristi Pruitt RN    Specimens:   ID Type Source Tests Collected by Time Destination   1 : Colon, Transverse polyp  Preservative Colon, Transverse  Dax Coyne MD 12/1/2021 1304 Pathology   2 : Colon, Ascending polyp  Preservative Colon, Ascending  Dax Coyne MD 12/1/2021 1307 Pathology   3 : Cecum BX Preservative Cecum  Dax Coyne MD 12/1/2021 1310 Pathology   4 : Colon, Ascending polyp BX Preservative Colon, Ascending  Dax Coyne MD 12/1/2021 1314 Pathology   5 : Left colon polyp BX Preservative   Dax Coyne MD 12/1/2021 1317 Pathology     H. Pylori  no    Assessment:  Intra-procedure medications     Anesthesia gave intra-procedure sedation and medications, see anesthesia flow sheet yes    Intravenous fluids: NS@ KVO     Vital signs stable     Abdominal assessment: round and soft     Recommendation:  Discharge patient per MD order.   Return to floor  Family or Friend   Permission to share finding with family or friend yes Needs sent to new pharmacy.

## 2023-09-13 RX ORDER — LISDEXAMFETAMINE DIMESYLATE 60 MG/1
60 CAPSULE ORAL DAILY
Qty: 30 CAPSULE | Refills: 0 | Status: SHIPPED | OUTPATIENT
Start: 2023-09-13 | End: 2023-09-13

## 2023-09-13 RX ORDER — LISDEXAMFETAMINE DIMESYLATE 60 MG/1
60 CAPSULE ORAL DAILY
Qty: 30 CAPSULE | Refills: 0 | Status: SHIPPED | OUTPATIENT
Start: 2023-09-13 | End: 2023-10-08

## 2023-09-27 NOTE — PATIENT INSTRUCTIONS
Due for pap smear. Please schedule with gynecology or a family practice provider.      Psychologists/ Counselors                        Jas Euceda          ...            ..[phone]  [facility]                    ..  [phone]  [facility]                 [phone]  [facility] (kids)       .         [phone]  [facility](teens)                ..[phone]  [facility]                    [phone]  [facility]                   [phone]  [facility]           ...      .. [phone]  [facility]                ...[phone]  [facility]     ...          ...[phone]  [facility]               [phone]   [facility]..            [phone]  [facility]                       [phone]  [facility]               .    [phone]  [facility]                ..[phone]  [facility]     .      . ..[phone]     [facility]              .   [phone]  [facility]                   [phone]              [facility]              .   [phone]  [facility]              .  ...[phone]  [facility]              .   ..[phone]  [facility]           ...      .. [phone]  [facility]              . ..[phone]  [facility]              .    ..[phone]  [facility]              .     .. [phone]  [facility]                 ... [phone]  [facility]                         .[phone]  [facility]     .      . ..[phone]  [facility]...............................................................[phone]            [facility]              ..  [phone]                   [facility]             . ... ..[phone]  [facility]              .      ..[phone]  [facility]              .     ..[phone]  [facility]        .      . [phone]  [facility]           .[phone]  [facility]         .    ..[phone]  [employer]             . ..  ..  [phone]  [facility]            [phone]  [facility]              ..[phone]  [facility]                     [phone]    [facility]           ..     .[phone]     [facility]    ...     ...[phone]     [facility]            [phone]                                                  [place]  Bakari Shearer                ..  .  [phone]  [facility]         .     .  [phone]  [facility] MARLIN Prieto              . [phone]  [facility]  ..        [phone]  [facility]              .    [phone]    [facility]               ...  .  [phone]  [facility]             . [phone]  [facility]            [phone]  [facility]     .      . ..[phone]               [facility]....................................................[phone]          *Facilities in bold italics indicate medication management  Services are offered.     Crisis support    If you or someone you know is struggling or in crisis, help is available. Call or text 009 or chat [employer]    The volunteer Crisis Counselor will help you move from a 'hot moment to a cool moment'      Preventive Health Recommendations  Female Ages 21 to 25     Yearly exam:   See your health care provider every year in order to  Review health changes.   Discuss preventive care.    Review your medicines if your doctor has prescribed any.    You should be tested each year for STDs  (sexually transmitted diseases).     Talk to your provider about how often you should have cholesterol testing.    Get a Pap test every three years. If you have an abnormal result, your doctor may have you test more often.    If you are at risk for diabetes, you should have a diabetes test (fasting glucose).     Shots:   Get a flu shot each year.   Get a tetanus shot every 10 years.   Consider getting the shot (vaccine) that prevents cervical cancer (Gardasil).    Nutrition:   Eat at least 5 servings of fruits and vegetables each day.  Eat whole-grain bread, whole-wheat pasta and brown rice instead of white grains and rice.  Get adequate Calcium and Vitamin D.     Lifestyle  Exercise at least 150 minutes a week each week (30 minutes a day, 5 days a week). This will help you control your weight and prevent disease.  Limit alcohol to one drink per day.  No smoking.   Wear sunscreen to prevent skin cancer.  See your dentist every six months for an exam and cleaning.

## 2023-10-02 ENCOUNTER — OFFICE VISIT (OUTPATIENT)
Dept: PEDIATRICS | Facility: OTHER | Age: 23
End: 2023-10-02
Attending: NURSE PRACTITIONER
Payer: COMMERCIAL

## 2023-10-02 VITALS
TEMPERATURE: 97.3 F | DIASTOLIC BLOOD PRESSURE: 60 MMHG | BODY MASS INDEX: 22.49 KG/M2 | OXYGEN SATURATION: 100 % | RESPIRATION RATE: 16 BRPM | HEART RATE: 108 BPM | SYSTOLIC BLOOD PRESSURE: 124 MMHG | WEIGHT: 143.3 LBS | HEIGHT: 67 IN

## 2023-10-02 DIAGNOSIS — Z00.00 ROUTINE GENERAL MEDICAL EXAMINATION AT A HEALTH CARE FACILITY: Primary | ICD-10-CM

## 2023-10-02 DIAGNOSIS — F90.2 ATTENTION DEFICIT HYPERACTIVITY DISORDER (ADHD), COMBINED TYPE: ICD-10-CM

## 2023-10-02 PROCEDURE — 99395 PREV VISIT EST AGE 18-39: CPT | Performed by: NURSE PRACTITIONER

## 2023-10-02 ASSESSMENT — PAIN SCALES - GENERAL: PAINLEVEL: NO PAIN (0)

## 2023-10-02 ASSESSMENT — ENCOUNTER SYMPTOMS
COUGH: 0
CONSTIPATION: 0
DIZZINESS: 0
CHILLS: 0
FEVER: 0
ABDOMINAL PAIN: 0
HEADACHES: 0
DIARRHEA: 0
MYALGIAS: 0
DYSURIA: 0
HEMATOCHEZIA: 0
FREQUENCY: 0
JOINT SWELLING: 0
HEMATURIA: 0
WEAKNESS: 0
ARTHRALGIAS: 0
NAUSEA: 0
NERVOUS/ANXIOUS: 1
HEARTBURN: 0
PALPITATIONS: 0
PARESTHESIAS: 0
SORE THROAT: 0
SHORTNESS OF BREATH: 0
EYE PAIN: 0
BREAST MASS: 0

## 2023-10-02 ASSESSMENT — ANXIETY QUESTIONNAIRES
1. FEELING NERVOUS, ANXIOUS, OR ON EDGE: SEVERAL DAYS
GAD7 TOTAL SCORE: 1
5. BEING SO RESTLESS THAT IT IS HARD TO SIT STILL: NOT AT ALL
4. TROUBLE RELAXING: NOT AT ALL
2. NOT BEING ABLE TO STOP OR CONTROL WORRYING: NOT AT ALL
6. BECOMING EASILY ANNOYED OR IRRITABLE: NOT AT ALL
GAD7 TOTAL SCORE: 1
3. WORRYING TOO MUCH ABOUT DIFFERENT THINGS: NOT AT ALL
7. FEELING AFRAID AS IF SOMETHING AWFUL MIGHT HAPPEN: NOT AT ALL

## 2023-10-02 ASSESSMENT — PATIENT HEALTH QUESTIONNAIRE - PHQ9
10. IF YOU CHECKED OFF ANY PROBLEMS, HOW DIFFICULT HAVE THESE PROBLEMS MADE IT FOR YOU TO DO YOUR WORK, TAKE CARE OF THINGS AT HOME, OR GET ALONG WITH OTHER PEOPLE: SOMEWHAT DIFFICULT
SUM OF ALL RESPONSES TO PHQ QUESTIONS 1-9: 3
SUM OF ALL RESPONSES TO PHQ QUESTIONS 1-9: 3

## 2023-10-02 NOTE — PROGRESS NOTES
SUBJECTIVE:   CC: Mo is an 23 year old who presents for preventive health visit.       10/2/2023    10:17 AM   Additional Questions   Roomed by Madina Morfin   Accompanied by self       Healthy Habits:     Getting at least 3 servings of Calcium per day:  NO    Bi-annual eye exam:  NO    Dental care twice a year:  NO    Sleep apnea or symptoms of sleep apnea:  None    Diet:  Regular (no restrictions)    Frequency of exercise:  1 day/week    Duration of exercise:  45-60 minutes    Taking medications regularly:  Yes    Medication side effects:  Other    Additional concerns today:  No    ADHD: Feels her current medication (Vyvanse 60 mg) is working well.    Endorses intermittent anxiety, which tends to last a day or so and then improve. Would like to talk about a possible referral for therapy at her next visit.    Currently living with her parents. Her partner lives in [place]. Mo is not currently working or in school, but is actively looking for a job. She is thinking of applying at [employer] in [place] and either living with her partner or staying with her parents in [place] and commuting. She feels that her anxiety is better when she is busy, so believes it will get better once she is working and in more of a routine.    Today's PHQ-9 Score:       10/2/2023    10:11 AM   PHQ-9 SCORE   PHQ-9 Total Score MyChart 3 (Minimal depression)   PHQ-9 Total Score 3           Social History     Tobacco Use    Smoking status: Never    Smokeless tobacco: Never    Tobacco comments:     no passive exposure   Substance Use Topics    Alcohol use: No     Alcohol/week: 0.0 standard drinks of alcohol             10/2/2023    10:21 AM   Alcohol Use   Prescreen: >3 drinks/day or >7 drinks/week? Not Applicable          No data to display              Reviewed orders with patient.  Reviewed health maintenance and updated orders accordingly - Yes  Labs ordered, will have drawn when Mo returns for pap smear.  BP Readings from  Last 3 Encounters:   10/02/23 124/60   07/27/23 108/78   03/29/23 112/66    Wt Readings from Last 3 Encounters:   10/02/23 65 kg (143 lb 4.8 oz)   07/27/23 65.4 kg (144 lb 2.9 oz)   03/29/23 64.4 kg (142 lb)                  Patient Active Problem List   Diagnosis    Common wart    Plantar warts    Attention deficit hyperactivity disorder (ADHD), combined type    High ankle sprain    Anxiety disorder, unspecified     Past Surgical History:   Procedure Laterality Date    KNEE SURGERY Left 03/02/2022    Open ACL repair    PE TUBES      TUBES         Social History     Tobacco Use    Smoking status: Never    Smokeless tobacco: Never    Tobacco comments:     no passive exposure   Substance Use Topics    Alcohol use: No     Alcohol/week: 0.0 standard drinks of alcohol     Family History   Problem Relation Age of Onset    Arthritis Mother     Depression Mother     Diabetes Paternal Grandmother     Cancer Maternal Grandmother         lymphoma    Depression Maternal Grandmother     Cancer Paternal Grandfather         lymphoma    Asthma No family hx of          Current Outpatient Medications   Medication Sig Dispense Refill    lisdexamfetamine (VYVANSE) 60 MG capsule Take 1 capsule (60 mg) by mouth daily 30 capsule 0    ibuprofen (ADVIL/MOTRIN) 200 MG capsule Take 400 mg by mouth every 4 hours as needed for fever (Patient not taking: Reported on 3/29/2023)       No Known Allergies    Breast Cancer Screening:        History of abnormal Pap smear: NO - age 21-29 PAP every 3 years recommended     Reviewed and updated as needed this visit by clinical staff   Tobacco  Allergies  Meds  Problems  Med Hx  Surg Hx  Fam Hx          Reviewed and updated as needed this visit by Provider   Tobacco  Allergies  Meds  Problems  Med Hx  Surg Hx  Fam Hx             Review of Systems   Constitutional:  Negative for chills and fever.   HENT:  Negative for congestion, ear pain, hearing loss and sore throat.    Eyes:  Negative for  "pain and visual disturbance.   Respiratory:  Negative for cough and shortness of breath.    Cardiovascular:  Negative for chest pain, palpitations and peripheral edema.   Gastrointestinal:  Negative for abdominal pain, constipation, diarrhea, heartburn, hematochezia and nausea.   Breasts:  Negative for tenderness, breast mass and discharge.   Genitourinary:  Positive for vaginal discharge. Negative for dysuria, frequency, genital sores, hematuria, pelvic pain, urgency and vaginal bleeding.   Musculoskeletal:  Negative for arthralgias, joint swelling and myalgias.   Skin:  Negative for rash.   Neurological:  Negative for dizziness, weakness, headaches and paresthesias.   Psychiatric/Behavioral:  Negative for mood changes. The patient is nervous/anxious.           OBJECTIVE:   /60 (BP Location: Right arm, Patient Position: Chair, Cuff Size: Adult Regular)   Pulse 108   Temp 97.3  F (36.3  C) (Tympanic)   Resp 16   Ht 1.689 m (5' 6.5\")   Wt 65 kg (143 lb 4.8 oz)   LMP 09/15/2023   SpO2 100%   BMI 22.78 kg/m    Physical Exam  GENERAL: healthy, alert and no distress  EYES: Eyes grossly normal to inspection, PERRL and conjunctivae and sclerae normal  HENT: ear canals and TM's normal, nose and mouth without ulcers or lesions  NECK: no adenopathy, no asymmetry, masses, or scars and thyroid normal to palpation  RESP: lungs clear to auscultation - no rales, rhonchi or wheezes  CV: regular rate and rhythm, normal S1 S2, no S3 or S4, no murmur, click or rub, no peripheral edema and peripheral pulses strong  ABDOMEN: soft, nontender, no hepatosplenomegaly, no masses and bowel sounds normal   (female): deferred  MS: no gross musculoskeletal defects noted, no edema  SKIN: Acne to face, no other suspicious lesions or rashes  NEURO: Normal strength and tone, mentation intact and speech normal  PSYCH: mentation appears normal, affect normal/bright    Diagnostic Test Results:  pending    ASSESSMENT/PLAN:   1. Routine " general medical examination at a health care facility  List of local therapists given in AVS; will further discuss anxiety at next appointment or sooner as needed. Pap smear deferred, will make appointment with gynecology or family practice  - HIV Antigen Antibody Combo; Future  - Hepatitis C Screen Reflex to HCV RNA Quant and Genotype; Future  - CBC with platelets; Future  - Ferritin; Future  - TSH with free T4 reflex; Future  - Vitamin D Deficiency; Future  - Lipid Profile (Chol, Trig, HDL, LDL calc); Future    2. Attention deficit hyperactivity disorder (ADHD), combined type  Will continue Vyvanse 60 mg daily and follow up in about 3-4 months            COUNSELING:  Reviewed preventive health counseling, as reflected in patient instructions       Regular exercise       Healthy diet/nutrition       Consider Hep C screening for all patients one time for ages 18-79 years       HIV screeninx in teen years, 1x in adult years, and at intervals if high risk        She reports that she has never smoked. She has never used smokeless tobacco.          RODERICK Pollard CNP  [facility]

## 2023-10-08 ENCOUNTER — MYC REFILL (OUTPATIENT)
Dept: PEDIATRICS | Facility: OTHER | Age: 23
End: 2023-10-08

## 2023-10-08 DIAGNOSIS — F90.2 ATTENTION DEFICIT HYPERACTIVITY DISORDER (ADHD), COMBINED TYPE: ICD-10-CM

## 2023-10-09 RX ORDER — LISDEXAMFETAMINE DIMESYLATE 60 MG/1
60 CAPSULE ORAL DAILY
Qty: 30 CAPSULE | Refills: 0 | Status: SHIPPED | OUTPATIENT
Start: 2023-10-09 | End: 2023-11-07

## 2023-10-09 NOTE — TELEPHONE ENCOUNTER
Lakeshia      Last Written Prescription Date:  9/13/23  Last Fill Quantity: 30,   # refills: 0  Last Office Visit: 10/2/23  Future Office visit:       Routing refill request to provider for review/approval because:

## 2023-11-07 ENCOUNTER — MYC REFILL (OUTPATIENT)
Dept: PEDIATRICS | Facility: OTHER | Age: 23
End: 2023-11-07

## 2023-11-07 DIAGNOSIS — F90.2 ATTENTION DEFICIT HYPERACTIVITY DISORDER (ADHD), COMBINED TYPE: ICD-10-CM

## 2023-11-08 RX ORDER — LISDEXAMFETAMINE DIMESYLATE 60 MG/1
60 CAPSULE ORAL DAILY
Qty: 30 CAPSULE | Refills: 0 | Status: SHIPPED | OUTPATIENT
Start: 2023-11-08 | End: 2023-12-07

## 2023-11-08 NOTE — TELEPHONE ENCOUNTER
Lakeshia      Last Written Prescription Date:  10.9.23  Last Fill Quantity: #30,   # refills: 0  Last Office Visit: 10.2.23  Future Office visit:       Routing refill request to provider for review/approval because:  Drug not on the [facility], P or [facility] refill protocol or controlled substance

## 2023-12-07 ENCOUNTER — MYC REFILL (OUTPATIENT)
Dept: PEDIATRICS | Facility: OTHER | Age: 23
End: 2023-12-07

## 2023-12-07 DIAGNOSIS — F90.2 ATTENTION DEFICIT HYPERACTIVITY DISORDER (ADHD), COMBINED TYPE: ICD-10-CM

## 2023-12-08 RX ORDER — LISDEXAMFETAMINE DIMESYLATE 60 MG/1
60 CAPSULE ORAL DAILY
Qty: 30 CAPSULE | Refills: 0 | Status: SHIPPED | OUTPATIENT
Start: 2023-12-08 | End: 2024-01-06

## 2023-12-08 NOTE — TELEPHONE ENCOUNTER
lisdexamfetamine (VYVANSE) 60 MG capsule 30 capsule 0 11/8/2023   Last Office Visit: 10.2.23   Future Office visit:       Routing refill request to provider for review/approval because:

## 2024-01-06 ENCOUNTER — MYC REFILL (OUTPATIENT)
Dept: PEDIATRICS | Facility: OTHER | Age: 24
End: 2024-01-06

## 2024-01-06 DIAGNOSIS — F90.2 ATTENTION DEFICIT HYPERACTIVITY DISORDER (ADHD), COMBINED TYPE: ICD-10-CM

## 2024-01-08 RX ORDER — LISDEXAMFETAMINE DIMESYLATE 60 MG/1
60 CAPSULE ORAL DAILY
Qty: 30 CAPSULE | Refills: 0 | Status: SHIPPED | OUTPATIENT
Start: 2024-01-08 | End: 2024-02-08

## 2024-01-08 NOTE — TELEPHONE ENCOUNTER
Lakeshia      Last Written Prescription Date:  12/8/23  Last Fill Quantity: 30,   # refills: 0  Last Office Visit: 10/2/23  Future Office visit:

## 2024-02-08 ENCOUNTER — MYC REFILL (OUTPATIENT)
Dept: PEDIATRICS | Facility: OTHER | Age: 24
End: 2024-02-08

## 2024-02-08 DIAGNOSIS — F90.2 ATTENTION DEFICIT HYPERACTIVITY DISORDER (ADHD), COMBINED TYPE: ICD-10-CM

## 2024-02-08 RX ORDER — LISDEXAMFETAMINE DIMESYLATE 60 MG/1
60 CAPSULE ORAL DAILY
Qty: 30 CAPSULE | Refills: 0 | Status: SHIPPED | OUTPATIENT
Start: 2024-02-08 | End: 2024-03-11

## 2024-02-08 NOTE — TELEPHONE ENCOUNTER
Lakeshia      Last Written Prescription Date:  1/8/2024  Last Fill Quantity: 30,   # refills: 0  Last Office Visit: 10/2/2023  Future Office visit:       Routing refill request to provider for review/approval because:  Drug not on the [facility], P or [facility] refill protocol or controlled substance

## 2024-03-08 ENCOUNTER — MYC REFILL (OUTPATIENT)
Dept: PEDIATRICS | Facility: OTHER | Age: 24
End: 2024-03-08

## 2024-03-08 DIAGNOSIS — F90.2 ATTENTION DEFICIT HYPERACTIVITY DISORDER (ADHD), COMBINED TYPE: ICD-10-CM

## 2024-03-08 RX ORDER — LISDEXAMFETAMINE DIMESYLATE 60 MG/1
60 CAPSULE ORAL DAILY
Qty: 30 CAPSULE | Refills: 0 | Status: CANCELLED | OUTPATIENT
Start: 2024-03-08

## 2024-03-11 ENCOUNTER — VIRTUAL VISIT (OUTPATIENT)
Dept: PEDIATRICS | Facility: OTHER | Age: 24
End: 2024-03-11
Attending: NURSE PRACTITIONER
Payer: COMMERCIAL

## 2024-03-11 DIAGNOSIS — F41.8 OTHER SPECIFIED ANXIETY DISORDERS: ICD-10-CM

## 2024-03-11 DIAGNOSIS — F90.2 ATTENTION DEFICIT HYPERACTIVITY DISORDER (ADHD), COMBINED TYPE: Primary | ICD-10-CM

## 2024-03-11 PROCEDURE — 99213 OFFICE O/P EST LOW 20 MIN: CPT | Mod: 93 | Performed by: NURSE PRACTITIONER

## 2024-03-11 RX ORDER — LISDEXAMFETAMINE DIMESYLATE 60 MG/1
60 CAPSULE ORAL DAILY
Qty: 30 CAPSULE | Refills: 0 | Status: SHIPPED | OUTPATIENT
Start: 2024-03-11 | End: 2024-04-09

## 2024-03-11 NOTE — TELEPHONE ENCOUNTER
Lakeshia      Last Written Prescription Date:  2.8.24  Last Fill Quantity: #30,   # refills: 0  Last Office Visit: 10.2.23  Future Office visit:       Routing refill request to provider for review/approval because:  Drug not on the [facility], P or [facility] refill protocol or controlled substance

## 2024-03-11 NOTE — PROGRESS NOTES
"Mo is a 23 year old who is being evaluated via a billable telephone visit.      What phone number would you like to be contacted at? [phone]  How would you like to obtain your AVS? Abel  Originating Location (pt. Location): Other Work    Distant Location (provider location):  On-site    Assessment & Plan     Attention deficit hyperactivity disorder (ADHD), combined type  Will continue Vyvanse 60 mg and follow up in about 6 months; sooner with concerns  - lisdexamfetamine (VYVANSE) 60 MG capsule; Take 1 capsule (60 mg) by mouth daily  - Adult Mental Health  Referral; Future    Other specified anxiety disorders  Referral made for therapy  - Adult Mental Health  Referral; Future        Return in about 6 months (around 9/11/2024) for Medication follow up, sooner with concerns.    Subjective   Mo is a 23 year old, presenting for the following health issues:  Recheck Medication        3/11/2024    12:29 PM   Additional Questions   Roomed by Jamaal De Oliveira   Accompanied by None         3/11/2024    12:29 PM   Patient Reported Additional Medications   Patient reports taking the following new medications None     HPI     ADHD  Onset: since childhood   Description:   Easily distracted: YES  Short attention span: YES  Trouble following directions: No   Impulsive behavior: YES- Improved    Trouble completing tasks: YES  Accompanying Signs & Symptoms:        Change in sleep pattern: Falling asleep in the couch, sleep schedule is \"messed up.\"  Irritability at certain times of the day: YES- Certain days are worse than others   Socially withdrawn: YES  Depression symptoms: No  Anxiety symptoms: YES - intermittent bouts of anxiety. More difficulty coping some days when very anxious, but other days better. Would like to look into therapy with someone in [place].   History:  Caffeine intake: None  Loss of appetite: YES  Healthy diet: No  Did you have problems in school or with previous employment: " No  Family history of ADHD: No  Have you had an evaluation for ADHD in the past: YES  Do you use alcohol or drugs: No  Therapies tried: Vyvanse with intermittent relief   Currently living with parents, will be moving in with her girlfriend in mid-April or May. Moving to [place]. 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  Additional stress - grandfather had a stroke last week.        Review of Systems  Constitutional, HEENT, cardiovascular, pulmonary, gi and gu systems are negative, except as otherwise noted.      Objective    Vitals - Patient Reported  Pain Score: No Pain (0)      Vitals:  No vitals were obtained today due to virtual visit.    Physical Exam   General: Alert and no distress //Respiratory: No audible wheeze, cough, or shortness of breath // Psychiatric:  Appropriate affect, tone, and pace of words            Phone call duration: 12 minutes  Signed Electronically by: RODERICK Pollard CNP

## 2024-04-09 ENCOUNTER — MYC REFILL (OUTPATIENT)
Dept: PEDIATRICS | Facility: OTHER | Age: 24
End: 2024-04-09

## 2024-04-09 DIAGNOSIS — F90.2 ATTENTION DEFICIT HYPERACTIVITY DISORDER (ADHD), COMBINED TYPE: ICD-10-CM

## 2024-04-09 RX ORDER — LISDEXAMFETAMINE DIMESYLATE 60 MG/1
60 CAPSULE ORAL DAILY
Qty: 30 CAPSULE | Refills: 0 | Status: SHIPPED | OUTPATIENT
Start: 2024-04-09 | End: 2024-05-08

## 2024-04-09 NOTE — TELEPHONE ENCOUNTER
Lakeshia      Last Written Prescription Date:  3/11/2024  Last Fill Quantity: 30,   # refills: 0  Last Office Visit: 3/11/2024  Future Office visit:       Routing refill request to provider for review/approval because:  Drug not on the [facility], P or [facility] refill protocol or controlled substance

## 2024-05-08 ENCOUNTER — MYC REFILL (OUTPATIENT)
Dept: PEDIATRICS | Facility: OTHER | Age: 24
End: 2024-05-08

## 2024-05-08 DIAGNOSIS — F90.2 ATTENTION DEFICIT HYPERACTIVITY DISORDER (ADHD), COMBINED TYPE: ICD-10-CM

## 2024-05-08 RX ORDER — LISDEXAMFETAMINE DIMESYLATE 60 MG/1
60 CAPSULE ORAL DAILY
Qty: 30 CAPSULE | Refills: 0 | Status: SHIPPED | OUTPATIENT
Start: 2024-05-08 | End: 2024-06-05

## 2024-05-08 NOTE — TELEPHONE ENCOUNTER
Lakeshia      Last Written Prescription Date:  4/9/24  Last Fill Quantity: 30,   # refills: 0  Last Office Visit: 3/11/24  Future Office visit:       Routing refill request to provider for review/approval because:

## 2024-06-05 ENCOUNTER — MYC REFILL (OUTPATIENT)
Dept: PEDIATRICS | Facility: OTHER | Age: 24
End: 2024-06-05

## 2024-06-05 DIAGNOSIS — F90.2 ATTENTION DEFICIT HYPERACTIVITY DISORDER (ADHD), COMBINED TYPE: ICD-10-CM

## 2024-06-05 RX ORDER — LISDEXAMFETAMINE DIMESYLATE 60 MG/1
60 CAPSULE ORAL DAILY
Qty: 30 CAPSULE | Refills: 0 | Status: SHIPPED | OUTPATIENT
Start: 2024-06-05 | End: 2024-07-08

## 2024-07-08 ENCOUNTER — MYC REFILL (OUTPATIENT)
Dept: PEDIATRICS | Facility: OTHER | Age: 24
End: 2024-07-08

## 2024-07-08 DIAGNOSIS — F90.2 ATTENTION DEFICIT HYPERACTIVITY DISORDER (ADHD), COMBINED TYPE: ICD-10-CM

## 2024-07-08 RX ORDER — LISDEXAMFETAMINE DIMESYLATE 60 MG/1
60 CAPSULE ORAL DAILY
Qty: 30 CAPSULE | Refills: 0 | Status: SHIPPED | OUTPATIENT
Start: 2024-07-08 | End: 2024-08-07

## 2024-07-08 NOTE — TELEPHONE ENCOUNTER
Lakeshia      Last Written Prescription Date:  6/5/2024  Last Fill Quantity: 30,   # refills: 0  Last Office Visit: 3/11/2024  Future Office visit:       Routing refill request to provider for review/approval because:  Drug not on the [facility], P or [facility] refill protocol or controlled substance

## 2024-08-07 ENCOUNTER — MYC REFILL (OUTPATIENT)
Dept: PEDIATRICS | Facility: OTHER | Age: 24
End: 2024-08-07

## 2024-08-07 DIAGNOSIS — F90.2 ATTENTION DEFICIT HYPERACTIVITY DISORDER (ADHD), COMBINED TYPE: ICD-10-CM

## 2024-08-07 RX ORDER — LISDEXAMFETAMINE DIMESYLATE 60 MG/1
60 CAPSULE ORAL DAILY
Qty: 30 CAPSULE | Refills: 0 | Status: SHIPPED | OUTPATIENT
Start: 2024-08-07 | End: 2024-09-07

## 2024-08-07 NOTE — TELEPHONE ENCOUNTER
Lakeshia       Last Written Prescription Date:  7/8/2024  Last Fill Quantity: 30,   # refills: 0  Last Office Visit: 3/11/2024

## 2024-09-07 ENCOUNTER — MYC REFILL (OUTPATIENT)
Dept: PEDIATRICS | Facility: OTHER | Age: 24
End: 2024-09-07

## 2024-09-07 DIAGNOSIS — F90.2 ATTENTION DEFICIT HYPERACTIVITY DISORDER (ADHD), COMBINED TYPE: ICD-10-CM

## 2024-09-09 RX ORDER — LISDEXAMFETAMINE DIMESYLATE 60 MG/1
60 CAPSULE ORAL DAILY
Qty: 30 CAPSULE | Refills: 0 | Status: SHIPPED | OUTPATIENT
Start: 2024-09-09

## 2024-09-09 NOTE — TELEPHONE ENCOUNTER
Lakeshia      Last Written Prescription Date:  8/7/2024  Last Fill Quantity: 30,   # refills: 0  Last Office Visit: 3/11/2024  Future Office visit:       Routing refill request to provider for review/approval because:  Drug not on the [facility], P or [facility] refill protocol or controlled substance

## 2024-10-04 NOTE — PROGRESS NOTES
Assessment & Plan     Attention deficit hyperactivity disorder (ADHD), combined type  Doing well with Vyvanse 60 mg daily. Will continue and follow up in about 4 months. May have refills through February 2025.    Phone number for Mehul Siegel printed in [facility] (where therapy referral was sent). Will contact me if needing a new referral, or if desiring it to be sent elsewhere.    Viral URI with cough  Negative for Covid-19, influenza, RSV. Symptoms are mild at present. Continue symptomatic treatment: encourage fluid intake, humidification. Nasal saline, may try  honey for cough. Symptoms should improve after 7-10 days of illness, although cough may persist longer. Cough should improve over time.     - Symptomatic Influenza A/B, RSV, & SARS-CoV2 PCR (COVID-19) Nose    Acne vulgaris  Mo will contact me via [employer] regarding the supplement she would like to ask about. Continue cleansing and moisturizing twice daily. Recommend trying Differin OTC at night; she had previously used tretinoin, but skin was sensitive.    34 minutes spent on the date of the encounter doing chart review, history and exam, documentation and further activities per the note      Return in about 4 months (around 2/9/2025) for Medication follow up, due for preventative visit, due for PAP testing.  May contact [facility] in [place] for PAP testing, or may schedule here with gynecology.    Sonny Hassan is a 24 year old, presenting for the following health issues:  A.D.H.D        10/9/2024     1:55 PM   Additional Questions   Roomed by Mo Oreilly   Accompanied by Self         10/9/2024     1:55 PM   Patient Reported Additional Medications   Patient reports taking the following new medications none     History of Present Illness       Reason for visit:  Med check   She is taking medications regularly.         Medication Followup - Vyvanse  Taking Medication as prescribed: yes  Side Effects:  Lack of appetite, nausea, and will feel  off balance. States she is used to the side effects.   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  Grandfather passed away in April, which has been hard.  Did not hear about therapy referral that was placed last spring    Acute Illness  Acute illness concerns: Allergy symptoms vs illness   Onset/Duration: 10/8/2024  Symptoms:  Fever: No  Chills/Sweats: YES - chills  Headache (location?): No  Sinus Pressure: YES  Conjunctivitis:  No  Ear Pain: YES: right  Rhinorrhea: No  Congestion: YES  Sore Throat: YES  Cough: YES-productive of clear sputum, productive of green sputum  Wheeze: No  Decreased Appetite: YES  Nausea: No  Vomiting: No  Diarrhea: No  Dysuria/Freq.: No  Dysuria or Hematuria: No  Fatigue/Achiness: YES  Sick/Strep Exposure: No  Therapies tried and outcome: None    Symptoms started suddenly yesterday. Normal energy level, sleeping well. Interested in Covid-19 testing today.    Wondering about supplements that may help with acne. She has been considering some supplements that are supposed to reduce inflammation. Wants to know if they will be safe to take with her ADHD medication. She does not remember the name right now, but states she has taken in the past when she was taking doxycyline for her acne prescribed by dermatology. She will send a [employer] message with the name of the supplement after this visit. Currently using cleanser, moisturizer daily.       Review of Systems  Constitutional, HEENT, cardiovascular, pulmonary, gi and gu systems are negative, except as otherwise noted.      Objective    /70 (BP Location: Left arm, Patient Position: Sitting, Cuff Size: Adult Regular)   Pulse 94   Temp 98.8  F (37.1  C) (Tympanic)   Wt 71.4 kg (157 lb 4.8 oz)   LMP 09/12/2024 (Approximate)   SpO2 99%   BMI 25.01 kg/m    Body mass index is 25.01 kg/m .  Physical Exam   GENERAL: alert and no distress  EYES:  Eyes grossly normal to inspection, PERRL and conjunctivae and sclerae normal  HENT: normal cephalic/atraumatic, ear canals and TM's normal, nose and mouth without ulcers or lesions, oropharynx clear, oral mucous membranes moist, tonsillar erythema, and no tonsillar exudate  NECK: no adenopathy, no asymmetry, masses, or scars  RESP: lungs clear to auscultation - no rales, rhonchi or wheezes  CV: regular rate and rhythm, normal S1 S2, no S3 or S4, no murmur, click or rub, no peripheral edema  ABDOMEN: soft, nontender, no hepatosplenomegaly, no masses and bowel sounds normal  MS: no gross musculoskeletal defects noted, no edema  SKIN: Mild acne comedones to face  PSYCH: mentation appears normal, affect normal/bright    Results for orders placed or performed in visit on 10/09/24   Symptomatic Influenza A/B, RSV, & SARS-CoV2 PCR (COVID-19) Nose     Status: Normal    Specimen: Nose; Swab   Result Value Ref Range    Influenza A PCR Negative Negative    Influenza B PCR Negative Negative    RSV PCR Negative Negative    SARS CoV2 PCR Negative Negative    Narrative    Testing was performed using the Xpert Xpress CoV2/Flu/RSV Assay on the [employer] GeneXpert Instrument. This test should be ordered for the detection of SARS-CoV2, influenza, and RSV viruses in individuals with signs and symptoms of respiratory tract infection. This test is for in vitro diagnostic use under the [place] FDA for laboratories certified under CLIA to perform high or moderate complexity testing. This test has been [place] FDA cleared. A negative result does not rule out the presence of PCR inhibitors in the specimen or target RNA in concentration below the limit of detection for the assay. If only one viral target is positive but coinfection with multiple targets is suspected, the sample should be re-tested with another FDA cleared, approved, or authorized test, if coninfection would change clinical management. This test was validated by the [facility]. These laboratories are certified under the Clinical Laboratory Improvement Amendments of 1988 (CLIA-88) as qualified to perfom high complexity laboratory testing.             Signed Electronically by: RODERICK Pollard CNP

## 2024-10-09 ENCOUNTER — MYC REFILL (OUTPATIENT)
Dept: PEDIATRICS | Facility: OTHER | Age: 24
End: 2024-10-09

## 2024-10-09 ENCOUNTER — OFFICE VISIT (OUTPATIENT)
Dept: PEDIATRICS | Facility: OTHER | Age: 24
End: 2024-10-09
Attending: NURSE PRACTITIONER
Payer: COMMERCIAL

## 2024-10-09 VITALS
TEMPERATURE: 98.8 F | OXYGEN SATURATION: 99 % | HEART RATE: 94 BPM | SYSTOLIC BLOOD PRESSURE: 114 MMHG | WEIGHT: 157.3 LBS | DIASTOLIC BLOOD PRESSURE: 70 MMHG | BODY MASS INDEX: 25.01 KG/M2

## 2024-10-09 DIAGNOSIS — F90.2 ATTENTION DEFICIT HYPERACTIVITY DISORDER (ADHD), COMBINED TYPE: Primary | ICD-10-CM

## 2024-10-09 DIAGNOSIS — F90.2 ATTENTION DEFICIT HYPERACTIVITY DISORDER (ADHD), COMBINED TYPE: ICD-10-CM

## 2024-10-09 DIAGNOSIS — L70.0 ACNE VULGARIS: ICD-10-CM

## 2024-10-09 DIAGNOSIS — J06.9 VIRAL URI WITH COUGH: ICD-10-CM

## 2024-10-09 LAB
FLUAV RNA SPEC QL NAA+PROBE: NEGATIVE
FLUBV RNA RESP QL NAA+PROBE: NEGATIVE
RSV RNA SPEC NAA+PROBE: NEGATIVE
SARS-COV-2 RNA RESP QL NAA+PROBE: NEGATIVE

## 2024-10-09 PROCEDURE — 99214 OFFICE O/P EST MOD 30 MIN: CPT | Performed by: NURSE PRACTITIONER

## 2024-10-09 PROCEDURE — 87637 SARSCOV2&INF A&B&RSV AMP PRB: CPT | Performed by: NURSE PRACTITIONER

## 2024-10-09 ASSESSMENT — PAIN SCALES - GENERAL: PAINLEVEL: MILD PAIN (2)

## 2024-10-10 RX ORDER — LISDEXAMFETAMINE DIMESYLATE 60 MG/1
60 CAPSULE ORAL DAILY
Qty: 30 CAPSULE | Refills: 0 | Status: SHIPPED | OUTPATIENT
Start: 2024-10-10 | End: 2024-11-05

## 2024-11-05 ENCOUNTER — MYC REFILL (OUTPATIENT)
Dept: PEDIATRICS | Facility: OTHER | Age: 24
End: 2024-11-05

## 2024-11-05 DIAGNOSIS — F90.2 ATTENTION DEFICIT HYPERACTIVITY DISORDER (ADHD), COMBINED TYPE: ICD-10-CM

## 2024-11-05 RX ORDER — LISDEXAMFETAMINE DIMESYLATE 60 MG/1
60 CAPSULE ORAL DAILY
Qty: 30 CAPSULE | Refills: 0 | Status: SHIPPED | OUTPATIENT
Start: 2024-11-05

## 2024-11-05 NOTE — TELEPHONE ENCOUNTER
Lakeshia      Last Written Prescription Date:  10/10/2024  Last Fill Quantity: 30,   # refills: 0  Last Office Visit: 10/9/2024  Future Office visit:       Routing refill request to provider for review/approval because:  Drug not on the [facility], [facility] or [facility] refill protocol or controlled substance

## 2024-11-10 ENCOUNTER — HEALTH MAINTENANCE LETTER (OUTPATIENT)
Age: 24
End: 2024-11-10

## 2025-01-08 ENCOUNTER — MYC REFILL (OUTPATIENT)
Dept: PEDIATRICS | Facility: OTHER | Age: 25
End: 2025-01-08

## 2025-01-08 DIAGNOSIS — F90.2 ATTENTION DEFICIT HYPERACTIVITY DISORDER (ADHD), COMBINED TYPE: ICD-10-CM

## 2025-01-08 RX ORDER — LISDEXAMFETAMINE DIMESYLATE 60 MG/1
60 CAPSULE ORAL DAILY
Qty: 30 CAPSULE | Refills: 0 | Status: SHIPPED | OUTPATIENT
Start: 2025-01-08

## 2025-01-08 NOTE — TELEPHONE ENCOUNTER
Lakeshia      Last Written Prescription Date:  12/6/2024  Last Fill Quantity: 30,   # refills: 0  Last Office Visit: 10/09/2024  Future Office visit:       Routing refill request to provider for review/approval because:  Drug not on the [facility], [facility] or [facility] refill protocol or controlled substance

## 2025-02-04 ENCOUNTER — MYC REFILL (OUTPATIENT)
Dept: PEDIATRICS | Facility: OTHER | Age: 25
End: 2025-02-04

## 2025-02-04 DIAGNOSIS — F90.2 ATTENTION DEFICIT HYPERACTIVITY DISORDER (ADHD), COMBINED TYPE: ICD-10-CM

## 2025-02-04 RX ORDER — LISDEXAMFETAMINE DIMESYLATE 60 MG/1
60 CAPSULE ORAL DAILY
Qty: 30 CAPSULE | Refills: 0 | Status: SHIPPED | OUTPATIENT
Start: 2025-02-04

## 2025-02-04 NOTE — TELEPHONE ENCOUNTER
Lakeshia      Last Written Prescription Date:  1/8/2025  Last Fill Quantity: 30,   # refills: 0  Last Office Visit: 10/09/2024  Future Office visit:       Routing refill request to provider for review/approval because:  Drug not on the [facility], [facility] or [facility] refill protocol or controlled substance

## 2025-05-05 ENCOUNTER — MYC REFILL (OUTPATIENT)
Dept: PEDIATRICS | Facility: OTHER | Age: 25
End: 2025-05-05

## 2025-05-05 DIAGNOSIS — F90.2 ATTENTION DEFICIT HYPERACTIVITY DISORDER (ADHD), COMBINED TYPE: ICD-10-CM

## 2025-05-06 RX ORDER — LISDEXAMFETAMINE DIMESYLATE 60 MG/1
60 CAPSULE ORAL DAILY
Qty: 30 CAPSULE | Refills: 0 | Status: SHIPPED | OUTPATIENT
Start: 2025-05-06

## 2025-06-03 ENCOUNTER — MYC REFILL (OUTPATIENT)
Dept: PEDIATRICS | Facility: OTHER | Age: 25
End: 2025-06-03

## 2025-06-03 DIAGNOSIS — F90.2 ATTENTION DEFICIT HYPERACTIVITY DISORDER (ADHD), COMBINED TYPE: ICD-10-CM

## 2025-06-03 RX ORDER — LISDEXAMFETAMINE DIMESYLATE 60 MG/1
60 CAPSULE ORAL DAILY
Qty: 30 CAPSULE | Refills: 0 | Status: SHIPPED | OUTPATIENT
Start: 2025-06-03

## 2025-06-03 NOTE — TELEPHONE ENCOUNTER
Disp Refills Start End SONDRA   lisdexamfetamine (VYVANSE) 60 MG capsule 30 capsule 0 5/6/2025 -- No     Last Office Visit: 04/11/2025  Future Office visit:       Routing refill request to provider for review/approval because:

## 2025-07-07 ENCOUNTER — MYC REFILL (OUTPATIENT)
Dept: PEDIATRICS | Facility: OTHER | Age: 25
End: 2025-07-07

## 2025-07-07 DIAGNOSIS — F90.2 ATTENTION DEFICIT HYPERACTIVITY DISORDER (ADHD), COMBINED TYPE: ICD-10-CM

## 2025-07-07 NOTE — TELEPHONE ENCOUNTER
lisdexamfetamine (VYVANSE) 60 MG capsule         Last Written Prescription Date:  6/3/25  Last Fill Quantity: 30,   # refills: 0  Last Office Visit: 4/11/25  Future Office visit:       Routing refill request to provider for review/approval because:  Drug not on the [facility], [facility] or [facility] refill protocol or controlled substance

## 2025-07-08 RX ORDER — LISDEXAMFETAMINE DIMESYLATE 60 MG/1
60 CAPSULE ORAL DAILY
Qty: 30 CAPSULE | Refills: 0 | Status: SHIPPED | OUTPATIENT
Start: 2025-07-08

## 2025-08-04 ENCOUNTER — MYC REFILL (OUTPATIENT)
Dept: PEDIATRICS | Facility: OTHER | Age: 25
End: 2025-08-04

## 2025-08-04 DIAGNOSIS — F90.2 ATTENTION DEFICIT HYPERACTIVITY DISORDER (ADHD), COMBINED TYPE: ICD-10-CM

## 2025-08-05 RX ORDER — LISDEXAMFETAMINE DIMESYLATE 60 MG/1
60 CAPSULE ORAL DAILY
Qty: 30 CAPSULE | Refills: 0 | Status: SHIPPED | OUTPATIENT
Start: 2025-08-05

## 2025-09-02 ENCOUNTER — MYC REFILL (OUTPATIENT)
Dept: PEDIATRICS | Facility: OTHER | Age: 25
End: 2025-09-02

## 2025-09-02 DIAGNOSIS — F90.2 ATTENTION DEFICIT HYPERACTIVITY DISORDER (ADHD), COMBINED TYPE: ICD-10-CM

## 2025-09-03 RX ORDER — LISDEXAMFETAMINE DIMESYLATE 60 MG/1
60 CAPSULE ORAL DAILY
Qty: 30 CAPSULE | Refills: 0 | Status: SHIPPED | OUTPATIENT
Start: 2025-09-03